# Patient Record
Sex: FEMALE | Race: WHITE | NOT HISPANIC OR LATINO | URBAN - METROPOLITAN AREA
[De-identification: names, ages, dates, MRNs, and addresses within clinical notes are randomized per-mention and may not be internally consistent; named-entity substitution may affect disease eponyms.]

---

## 2019-03-01 ENCOUNTER — OFFICE (OUTPATIENT)
Dept: URBAN - METROPOLITAN AREA CLINIC 101 | Facility: CLINIC | Age: 64
End: 2019-03-01

## 2019-03-01 VITALS
WEIGHT: 150 LBS | HEIGHT: 65 IN | TEMPERATURE: 98.2 F | SYSTOLIC BLOOD PRESSURE: 130 MMHG | DIASTOLIC BLOOD PRESSURE: 89 MMHG | HEART RATE: 94 BPM

## 2019-03-01 DIAGNOSIS — R19.09 OTHER INTRA-ABDOMINAL AND PELVIC SWELLING, MASS AND LUMP: ICD-10-CM

## 2019-03-01 PROCEDURE — 99244 OFF/OP CNSLTJ NEW/EST MOD 40: CPT

## 2019-03-01 NOTE — SERVICENOTES
Patient understands and agrees with plan. Questions/concerns addressed. Extensive discussion regarding suspected diagnosis, reviewing symptoms, reviewing imaging results, examining patient, discussing plan, reviewing case with other physicians and counseling patient.

## 2019-03-01 NOTE — SERVICEHPINOTES
JD MATTHEWS   is a   63   year old    female who is being seen in consultation at the request of   CHASE MCBRIDE   for painless jaundice for past few weeks. She states she noted dark urine and pale stools in Jan 2019 but the skin color change and scleral icterus only started 2-3 weeks ago. She had no abdominal pain during symptom onset. She noted some nausea a few days ago but nothing prior to that and nothing since. No vomiting. She has a history of IBS-D and takes immodium. She notes diarrhea with spicy food and fried food. She denies any rectal bleeding or melena. She has noted a recent decrease in appetite. She notes a 40 lb weight loss over the past year which she initially attributed to dietary modification (portion control). She notes a 10 lb weight loss over the past month. She denies any heartburn or dysphagia. She notes some chills recently but no fevers or night sweats. She notes some pruritis intermittently, more so yesterday. U/S and CT scan have revealed pancreatic mass.